# Patient Record
Sex: FEMALE | Race: WHITE
[De-identification: names, ages, dates, MRNs, and addresses within clinical notes are randomized per-mention and may not be internally consistent; named-entity substitution may affect disease eponyms.]

---

## 2018-05-18 NOTE — PDGENHP
History and Physical


History and Physical: 





Assessment and Plan:





1. Endometriosis, pelvic peritoneum





Regina appears to have persistent endometriosis.  She has failed medical 

management.  We reviewed all conservative and surgical options.  At the end of 

our discussion she is interested in surgical intervention.  This will be a 

robotic excision of all lesions of endometriosis.  Additionally she appears to 

have a 1.4 cm submucous polyp.  This will be removed hysteroscopically.











2. Dysmenorrhea











3. Dyspareunia in female











4. Menorrhagia with regular cycle











5. Endometrial polyp

















Subjective:











Patient ID: Regina Benavides is a 27 y.o. female who presents to [facility] 

AT [facility] for endometriosis.











ADE Tapia is a 27-year-old  0 woman who presents to discuss endometriosis.  

She lives in [place].  She has always had painful and heavy menses.  

In  she developed right lower quadrant pain.  She saw Dr. José Sharp who 

performed a laparoscopy in  where she was found to have a right 

hydrosalpinx which was drained.  The right ovary was adherent to the pelvic 

sidewall.  The left fallopian tube was described as clubbing at the fimbriated 

end.  Her pain persisted.  She was taken back to the operating room 4 months 

later where she was found to have endometriosis in the cul-de-sac, both pelvic 

sidewalls and both uterosacral ligaments.  This was treated by fulguration.  

Unfortunately this provided no pain relief.  She then was started on oral birth 

control pills in a continuous fashion.  However she had to discontinue this 

after several months of persistent bleeding.











Her cycles are regular every month.  She will bleed 5-7 days of which most are 

heavy.  She will use a super tampon which requires changing every 20-30 

minutes.  She develops pelvic discomfort, bloating, thigh and upper arm pain 

beginning about 1 week before her menses.  She also has periumbilical pain 

which she describes as sharp radiating outward.  She also has lateral pelvic 

pain and cramping.  She has deep dyspareunia and some dyschezia.  Interestingly 

she also develops cyclic presyncopal symptoms during her menses.  She does not 

develop it upon standing but once she begins walking.  She has had extensive 

evaluation without any known etiology.

















PastMedicalHistory





Past Medical History:





Diagnosis   Date





   Anxiety   





   "severe"





   Complication of anesthesia   





   "I had an anxiety attack when I woke up."





   Complication of anesthesia   2016





   "woke up screaming"





   Ovarian cyst   2012





   right





   Panic attacks   





   "severe"  





   Syncope   





   unsure of etiology























PastSurgicalHistory





Past Surgical History:





Procedure   Laterality   Date





   LAPAROSCOPY      2016





   TONSILLECTOMY      























CURRENT MEDICATIONS: 











Current Outpatient Prescriptions





Medication   Sig





   ibuprofen (ADVIL,MOTRIN) 800 mg tablet   Take 800 mg by mouth 3 times daily 

as needed for Pain. Take with food.

















No current facility-administered medications for this visit. 

















ALLERGIES: Adhesive and Other











I have reviewed, verified and agree with the past medical, surgical, birth, 

family, social  and ROS history as documented by the RN today.

















Objective:





Vital Signs: 





Visit Vitals











BP   98/62





Pulse   62





Resp   16





LMP   2018





SpO2   98%

















Physical Exam





Gen: This is an alert, well developed woman in no distress.





Neuro: She moves all extremities.





Psych: She is appropriate, oriented, with normal affect.





Neck: No thyroid enlargement, adenopathy, or tenderness.





Lungs: Clear to ascultation, no wheezes or rales.





Heart: Regular rate and rhythm without obvious murmurs.





Abdomen: Soft, non-tender, without guarding, rebound, or masses.





Extremities: No edema or cyanosis.





Pelvic: Normal external genitalia. Non-gaping introitus, vagina without 

discharge, adequately estrogenized, no significant prolapse. Cervix without 

lesions or discharge. Uterus normal sized, mobile, non-tender. Adnexa non-

tender without enlargement.  She is exquisitely tender surrounding the cervix 

posterior cul-de-sac, uterosacral ligaments, and both adnexa.  Her uterus has 

reduced mobility.











DATA:





I have reviewed the pertinent medical records. 





PELVIC ULTRASOUND

















Indication: Pelvic pain and heavy menses.











Findings: 











The uterus is anterior and measures 8.2 x 4.1 x 5.1 cm.  The endometrium 

measures 8 mm.  In the lower half of the uterus is an echolucent area measuring 

1.3 x 0.6 x 1.3 cm suggestive of a submucous polyp.











The right ovary measures 2.1 x 2.1 cm.  The left ovary measures 2.8 x 3.5 cm.  

It contains a simple cyst measuring 1.4 cm.











Impression:











1.3 cm submucous polyp.  





Both ovaries are possibly adherent to the pelvic sidewalls.  No evidence of 

cervical adhesions to the rectum.











  





TIME/COMMUNICATION:





I personally spent a total of 60 minutes. Of that 40 minutes was counseling/

coordination of patient's care. See my note above for details.











Suresh Hidalgo MD





Board Certified Female Pelvic Medicine and Reconstructive Surgery





Director of Minimally Invasive Gynecologic Surgery, [facility]





[facility] Surgeon in Minimally Invasive Gynecologic Surgery





[facility] Surgeon in Robotic Surgery

## 2018-05-23 ENCOUNTER — HOSPITAL ENCOUNTER (OUTPATIENT)
Dept: HOSPITAL 80 - FSGY | Age: 28
Discharge: HOME | End: 2018-05-23
Attending: OBSTETRICS & GYNECOLOGY
Payer: MEDICAID

## 2018-05-23 VITALS — SYSTOLIC BLOOD PRESSURE: 95 MMHG | DIASTOLIC BLOOD PRESSURE: 67 MMHG

## 2018-05-23 DIAGNOSIS — N80.9: ICD-10-CM

## 2018-05-23 DIAGNOSIS — N94.6: ICD-10-CM

## 2018-05-23 DIAGNOSIS — N84.0: Primary | ICD-10-CM

## 2018-05-23 DIAGNOSIS — F17.200: ICD-10-CM

## 2018-05-23 PROCEDURE — 0UB98ZX EXCISION OF UTERUS, VIA NATURAL OR ARTIFICIAL OPENING ENDOSCOPIC, DIAGNOSTIC: ICD-10-PCS | Performed by: OBSTETRICS & GYNECOLOGY

## 2018-05-23 PROCEDURE — 58558 HYSTEROSCOPY BIOPSY: CPT

## 2018-05-23 PROCEDURE — 0UDB8ZZ EXTRACTION OF ENDOMETRIUM, VIA NATURAL OR ARTIFICIAL OPENING ENDOSCOPIC: ICD-10-PCS | Performed by: OBSTETRICS & GYNECOLOGY

## 2018-05-23 PROCEDURE — C1765 ADHESION BARRIER: HCPCS

## 2018-05-23 RX ADMIN — HYDROMORPHONE HYDROCHLORIDE PRN MG: 1 INJECTION, SOLUTION INTRAMUSCULAR; INTRAVENOUS; SUBCUTANEOUS at 12:37

## 2018-05-23 RX ADMIN — Medication PRN MCG: at 09:28

## 2018-05-23 RX ADMIN — Medication PRN MCG: at 09:33

## 2018-05-23 RX ADMIN — HYDROMORPHONE HYDROCHLORIDE PRN MG: 1 INJECTION, SOLUTION INTRAMUSCULAR; INTRAVENOUS; SUBCUTANEOUS at 09:34

## 2018-05-23 RX ADMIN — HYDROMORPHONE HYDROCHLORIDE PRN MG: 1 INJECTION, SOLUTION INTRAMUSCULAR; INTRAVENOUS; SUBCUTANEOUS at 09:46

## 2018-05-23 NOTE — GOP
[f rep st]



                                                                OPERATIVE REPORT





DATE OF OPERATION:  05/23/2018



SURGEON:  Suresh Hidalgo MD



ASSISTANT:  Rosa Cunningham CFA.



ANESTHESIA:  General.



PREOPERATIVE DIAGNOSIS:  

1.  Submucous polyp.

2.  Menorrhagia.

3.  Dysmenorrhea.

4.  Endometriosis.

5.  Cyclic pelvic pain.

6.  Urinary frequency and urgency.



POSTOPERATIVE DIAGNOSIS:  

1.  Submucous polyp.

2.  Menorrhagia.

3.  Dysmenorrhea.

4.  Endometriosis.

5.  Cyclic pelvic pain.

6.  Urinary frequency and urgency.



PROCEDURE PERFORMED:  

1.  Hysteroscopy, polypectomy, with dilation and curettage.

2.  Robotic excision of extensive endometriosis in the anterior and posterior cul-de-sacs and bilater
al ovarian fossae.

3.  Bilateral ureterolysis.

4.  Excision of left adnexal lesion.

5.  Excision of portion of right ovary.

6.  Bilateral ovarian pexy.

7.  Cystoscopy.



FINDINGS:  



SPECIMENS:  

1.  Endometrial curettings.

2.  Pelvic peritoneum with endometriosis.

1.  Portion of right ovary.



3.  ESTIMATED BLOOD LOSS:  Scant.



DESCRIPTION OF PROCEDURE:  The patient was taken to the operating room.  She was identified.  General
 anesthesia was administered and found to be adequate.  She was placed in the lithotomy position and 
prepared and draped in normal sterile fashion.  A Castillo catheter was placed in her bladder. 



The cervix was gently dilated.  The diagnostic hysteroscope was advanced into the endometrial cavity.
  A submucous polyp was seen arising from the anterior wall.  A sharp curettage was then performed.  
Hysteroscopy was repeated, and the specimen appeared to have been removed.  A Hulka tenaculum was the
n placed. 



A 1 cm intraumbilical incision was made with a scalpel.  The Veress needle with the CO2 gas flowing w
as advanced into the peritoneal cavity.  The abdomen was then insufflated with carbon dioxide gas.  T
he 12 mm trocar followed by the laparoscope was then inserted.  The upper abdomen was unremarkable.  
There was no evidence of endometriosis on either diaphragm, liver, or stomach.  Two lateral ports wer
e placed on the right and one on the left under direct visualization.  She then was placed in Trendel
enburg position and the da Teo robot docked on the left side.  The instruments were then brought in
to the abdominal cavity under direct visualization. 



She was found to have endometriosis both in the anterior and posterior cul-de-sacs and bilateral ovar
scar fossae overlying both ureters and along both uterosacral ligaments, as well as the posterior cerv
ix. 



The entire posterior cul-de-sac peritoneum from the distal rectum up and including the cervix and lat
eral to the uterosacral ligaments was completely excised.  The anterior cul-de-sac peritoneum was the
n excised.  The lesions on the left near the left tube as well as the lesions on the right ovary were
 excised.  This included a small portion of the right ovarian stroma.  The remaining lesions were als
o treated.  A bilateral ovarian pexy was not performed due to the cyclic pelvic pain, but each ovary 
was attached to the ipsilateral round ligaments near the internal inguinal ring using 3-0 Vicryl Rapi
de suture.  Attention was then turned to the ovarian fossae.  She had multiple lesions of endometrios
is overlying both ureters.  As a result, a bilateral ureterolysis was required.  The peritoneum at th
e pelvic brim was incised.  The ureters were gently dissected free and lateralized from the pelvic br
im all the way down to where they crossed underneath the uterine arteries.  Once this was accomplishe
d, the entire ovarian fossa peritoneum from the pelvic brim down to the uterosacral ligaments and ant
eriorly to the ovaries was completely excised.  All specimens were sent to Pathology for permanent se
ction.  The pelvis was irrigated with sterile saline, and hemostasis was present.  Two sheets of Inte
rceed were placed in the posterior cul-de-sac and lateral pelvic sidewalls.  The robot was then undoc
ked.  The fascia was closed with 0 Vicryl, skin with 4-0 Monocryl and surgical adhesive. 



Cystoscopy was then performed.  Both ureters had vigorous jets of urine.  There was no evidence of bl
adder nor urethral injury seen.  There was no obvious pathology explaining the patient's urinary freq
uency and urgency other then her peritoneal endometriosis on the bladder.  Anesthesia was reversed.  
The patient taken to PACU awake, in stable condition.



COMPLICATIONS:  None.



DISPOSITION:  Patient stable to PACU.





Job #:  [id number]

## 2018-05-23 NOTE — POSTANESTH
Post Anesthetic Evaluation


Cardiovascular Status: Normal, Stable, Similar to Pre-Op Cond


Respiratory Status: Similar to Pre-op Cond.


Level of Consciousness/Mental Status: Can Participate in Eval


Pain Control: Inadeq, Add Tx Required


Nausea/Vomiting Control: Adequate, Prn Tx Ordered


Complications Possibly Related to Anesthesia: None Noted

## 2018-05-23 NOTE — POSTOPPROG
Post Op Note


Date of Operation: 05/23/18


Surgeon: Suresh Hidalgo


Assistant: Rosa Cunningham


Anesthesia: GET(General Endotracheal)


Pre-op Diagnosis: Endometriosis


Post-op Diagnosis: Same


Procedure: Robotic excision of endo, bilat ureterolyisis and ovarian pexy, cysto


Findings: Ureters function at end of case


Inf/Abcess present in the surg proc area at time of surgery?: No


EBL: Minimal


Complications: 





None


Specimen(s): 





Endometrial curretings


Peritoneum with endo

## 2018-05-23 NOTE — PDANEPAE
ANE History of Present Illness





Justinei assist endometriosis excision





ANE Past Medical History





- Cardiovascular History


Hx Hypertension: No


Hx Arrhythmias: No


Hx Chest Pain: No


Hx Coronary Artery / Peripheral Vascular Disease: No


Hx CHF / Valvular Disease: No


Hx Palpitations: No


Cardiovascular History Comment: has hx of syncope unknown etiology





- Pulmonary History


Hx COPD: No


Hx Asthma/Reactive Airway Disease: No


Hx Recent Upper Respiratory Infection: No


Hx Oxygen in Use at Home: No


Hx Sleep Apnea: No


Sleep Apnea Screening Result - Last Documented: Negative





- Neurologic History


Hx Cerebrovascular Accident: No


Hx Seizures: No


Hx Dementia: No





- Endocrine History


Hx Diabetes: No





- Renal History


Hx Renal Disorders: No





- Liver History


Hx Hepatic Disorders: No





- Neurological & Psychiatric Hx


Hx Neurological and Psychiatric Disorders: Yes


Neurological / Psychiatric History Comment: anxiety





- Cancer History


Hx Cancer: No





- Congenital Disorder History


Hx Congenital Disorders: No





- GI History


Hx Gastrointestinal Disorders: Yes


Gastrointestinal History Comment: Acid reflux, painful BM





- Other Health History


Other Health History: ? bladder spasms.  hx random hives.  endometriosis





- Chronic Pain History


Chronic Pain: Yes (under arm area tactile sensitivity)





- Surgical History


Prior Surgeries: 2 lap endometrial excisions





ANE Review of Systems


Review of systems is: negative


Review of Systems: 








- Exercise capacity


METS (RN): 6 METS





ANE Patient History





- Allergies


Allergies/Adverse Reactions: 








heart monitor patches Allergy (Uncoded 04/24/18 15:10)


 Other-Enter Comments








- Home Medications


Home medications: home medication list seen and reviewed


Home Medications: 








NK [No Known Home Meds]  04/24/18 [Last Taken Unknown]








- NPO status


NPO Since - Liquids (Date): 05/23/18


NPO Since - Liquids (Time): 04:30


NPO Since - Solids (Date): 05/22/18


NPO Since - Solids (Time): 22:00





- Anes Hx


Anes Hx: post operative nausea and vomiting





- Smoking Hx


Smoking Status: Heavy smoker





- Family Anes Hx


Family Anes Hx: none


Family Hx Anesthesia Complications: none





ANE Labs/Vital Signs





- Vital Signs


Vital Signs: reviewed preoperatively; see RN documention for details


Blood Pressure: 107/71


Heart Rate: 67


Respiratory Rate: 16


O2 Sat (%): 96


Height: 167.64 cm


Weight: 61.235 kg





ANE Physical Exam





- Airway


Neck exam: FROM


Mouth exam: normal dental/mouth exam





- Pulmonary


Pulmonary: no respiratory distress





- Cardiovascular


Cardiovascular: regular rate and rhythym





- ASA Status


ASA Status: II





ANE Anesthesia Plan


Anesthesia Plan: general endotracheal anesthesia